# Patient Record
Sex: MALE | Race: WHITE
[De-identification: names, ages, dates, MRNs, and addresses within clinical notes are randomized per-mention and may not be internally consistent; named-entity substitution may affect disease eponyms.]

---

## 2022-05-18 ENCOUNTER — HOSPITAL ENCOUNTER (EMERGENCY)
Dept: HOSPITAL 97 - ER | Age: 57
Discharge: HOME | End: 2022-05-18
Payer: SELF-PAY

## 2022-05-18 VITALS — SYSTOLIC BLOOD PRESSURE: 123 MMHG | DIASTOLIC BLOOD PRESSURE: 74 MMHG

## 2022-05-18 VITALS — OXYGEN SATURATION: 95 %

## 2022-05-18 DIAGNOSIS — R11.0: ICD-10-CM

## 2022-05-18 DIAGNOSIS — I10: ICD-10-CM

## 2022-05-18 DIAGNOSIS — R07.9: ICD-10-CM

## 2022-05-18 DIAGNOSIS — R51.9: Primary | ICD-10-CM

## 2022-05-18 LAB
BUN BLD-MCNC: 12 MG/DL (ref 7–18)
GLUCOSE SERPLBLD-MCNC: 98 MG/DL (ref 74–106)
HCT VFR BLD CALC: 48.8 % (ref 39.6–49)
LYMPHOCYTES # SPEC AUTO: 2 K/UL (ref 0.7–4.9)
PMV BLD: 9.9 FL (ref 7.6–11.3)
POTASSIUM SERPL-SCNC: 3.5 MMOL/L (ref 3.5–5.1)
RBC # BLD: 5.12 M/UL (ref 4.33–5.43)
TROPONIN I SERPL HS-MCNC: 4.5 PG/ML (ref ?–58.9)

## 2022-05-18 PROCEDURE — 80048 BASIC METABOLIC PNL TOTAL CA: CPT

## 2022-05-18 PROCEDURE — 36415 COLL VENOUS BLD VENIPUNCTURE: CPT

## 2022-05-18 PROCEDURE — 96374 THER/PROPH/DIAG INJ IV PUSH: CPT

## 2022-05-18 PROCEDURE — 85025 COMPLETE CBC W/AUTO DIFF WBC: CPT

## 2022-05-18 PROCEDURE — 70450 CT HEAD/BRAIN W/O DYE: CPT

## 2022-05-18 PROCEDURE — 96375 TX/PRO/DX INJ NEW DRUG ADDON: CPT

## 2022-05-18 PROCEDURE — 93005 ELECTROCARDIOGRAM TRACING: CPT

## 2022-05-18 PROCEDURE — 71045 X-RAY EXAM CHEST 1 VIEW: CPT

## 2022-05-18 PROCEDURE — 99284 EMERGENCY DEPT VISIT MOD MDM: CPT

## 2022-05-18 PROCEDURE — 84484 ASSAY OF TROPONIN QUANT: CPT

## 2022-05-18 NOTE — RAD REPORT
EXAM DESCRIPTION:  [facility]ra Single View5/18/2022 8:02 am

 

CLINICAL HISTORY:  Chest pain

 

COMPARISON:  none

 

FINDINGS:   The lungs appear clear of acute infiltrate. The heart is mildly enlarged. Mild prominence
 of mediastinum.

 

PA and lateral chest series recommended

## 2022-05-18 NOTE — ER
Nurse's Notes                                                                                     

 [facility]                                                                 

Name: Andrew Guerrero                                                                              

Age: 56 yrs                                                                                       

Sex: Male                                                                                         

: 1965                                                                                   

MRN: [id number]                                                                                   

Arrival Date: 2022                                                                          

Time: 07:36                                                                                       

Account#: [id number]                                                                            

Bed 18                                                                                            

Private MD:                                                                                       

Diagnosis: Headache;Essential (primary) hypertension                                              

                                                                                                  

Presentation:                                                                                     

                                                                                             

07:50 Chief complaint: Patient states: he woke up at approx 0530 with a "terrible" headache   ap3 

      and chest pain. Patient reports he is staying with his parents after the recent death       

      of his mother. He states they are heavy smokers, and isn't sure if his symptoms are         

      caused by being around the heavy smoke or his elevated blood pressure. Patient states       

      the headache is a 8/10 on a scale of 0-10. He his holding pressure on his head with his     

      hand in attempt to alleviate pain. Coronavirus screen: At this time, the client does        

      not indicate any symptoms associated with coronavirus-19. Ebola Screen: No symptoms or      

      risks identified at this time. Initial Sepsis Screen: Does the patient meet any 2           

      criteria? No. Patient's initial sepsis screen is negative. Does the patient have a          

      suspected source of infection? No. Patient's initial sepsis screen is negative. Risk        

      Assessment: Do you want to hurt yourself or someone else? Patient reports no desire to      

      harm self or others. Onset of symptoms was May 18, 2022 at 05:30.                           

07:50 Method Of Arrival: Ambulatory                                                           ap3 

07:50 Acuity: DANIELA 3                                                                           ap3 

                                                                                                  

Triage Assessment:                                                                                

07:55 Headache History: The patient has had previous headaches and this one is different than ap3 

      previous episodes. General: Appears uncomfortable, Behavior is cooperative. Pain:           

      Complains of pain in head and chest Pain currently is 8 out of 10 on a pain scale. Pain     

      began 2 hours ago. Also complains of chest pain. Neuro: Level of Consciousness is           

      awake, alert, obeys commands, Oriented to person, place, time, situation, Speech is         

      normal. Cardiovascular: Patient's skin is warm and dry. Respiratory: Airway is patent.      

                                                                                                  

- Immunization history:: Adult Immunizations unknown.                                             

- Social history:: Smoking status: Patient denies any tobacco usage or history of.                

  Patient uses alcohol, on a daily basis.                                                         

                                                                                                  

                                                                                                  

Screenin:26 Abuse screen: Denies threats or abuse. Denies injuries from another. Nutritional        bp  

      screening: No deficits noted. Tuberculosis screening: No symptoms or risk factors           

      identified. Fall Risk None identified.                                                      

                                                                                                  

Assessment:                                                                                       

08:25 General: SEE TRIAGE NOTE.                                                               bp  

09:52 Reassessment: PT D/C HOME AMBULATORY WITH FAMILY, DX WITH HEADACHE AND HTN.             bp  

                                                                                                  

Vital Signs:                                                                                      

07:50  / 110; Pulse 76; Resp 17; Pulse Ox 98% ; Weight 95.25 kg; Height 5 ft. 9 in.     ap3 

      (175.26 cm); Pain 8/10;                                                                     

09:01  / 82; Pulse 63; Resp 17; Pulse Ox 95% ;                                          bp  

09:51  / 74; Pulse 64; Resp 11; Pulse Ox 95% ;                                          bp  

07:50 Body Mass Index 31.01 (95.25 kg, 175.26 cm)                                             ap3 

                                                                                                  

ED Course:                                                                                        

07:36 Patient arrived in ED.                                                                  as  

07:42 Homar Phan DO is Attending Physician.                                                ms3 

07:53 Triage completed.                                                                       ap3 

07:56 Arm band placed on right wrist.                                                         ap3 

08:04 XRAY Chest (1 view) In Process Unspecified.                                             EDMS

08:09 CT Head Brain wo Cont In Process Unspecified.                                           EDMS

08:24 Horacio Foster, RN is Primary Nurse.                                                    bp  

08:26 Patient has correct armband on for positive identification. Bed in low position. Call   bp  

      light in reach. Side rails up X2.                                                           

08:39 Troponin HS Sent.                                                                       mh5 

08:39 Basic Metabolic Panel Sent.                                                             mh5 

08:40 Initial lab(s) drawn, by me, sent to lab. EKG done, by ED staff, reviewed by Homar Phan DO. Inserted saline lock: 20 gauge in right antecubital area, using aseptic            

      technique. Blood collected.                                                                 

08:41 Placed in gown. Warm blanket given. Pillow given. Cardiac monitor on. Pulse ox on. NIBP mh5 

      on.                                                                                         

09:39 Saúl Fitzgerald MD is Referral Physician.                                               ms3 

09:52 No provider procedures requiring assistance completed. IV discontinued.                 bp  

                                                                                                  

Administered Medications:                                                                         

07:44 CANCELLED (Physician Discretion): Aspirin Chewable Tablet 324 mg PO once; 81 mg tablets ms3 

      x 4                                                                                         

08:30 Drug: Ibuprofen 600 mg Route: PO;                                                       bp  

09:10 Drug: Reglan (metoCLOPramide) 10 mg Route: IVP; Site: right antecubital;                bp  

09:10 Drug: Benadryl (diphenhydrAMINE) 25 mg Route: IVP; Site: right antecubital;             bp  

09:10 Drug: Decadron - Dexamethasone 10 mg Route: IVP; Site: right antecubital;               bp  

                                                                                                  

                                                                                                  

Medication:                                                                                       

07:57 VIS not applicable for this client.                                                     ap3 

                                                                                                  

Outcome:                                                                                          

09:40 Discharge ordered by MD.                                                                ms3 

09:52 Discharged to home ambulatory, with family.                                             bp  

09:52 Condition: stable                                                                           

09:52 Discharge instructions given to patient, Instructed on discharge instructions, follow       

      up and referral plans. Demonstrated understanding of instructions, follow-up care.          

09:53 Patient left the ED.                                                                    bp  

                                                                                                  

Signatures:                                                                                       

Dispatcher [employer]                           EDMS                                                 

Pati Hagan Maria                              5                                                  

Horacio Foster RN                      RN   bp                                                   

Amisha Rasmussen RN                    RN   ap3                                                  

Homar Phan DO                        DO   ms3                                                  

                                                                                                  

**************************************************************************************************

## 2022-05-18 NOTE — RAD REPORT
EXAM DESCRIPTION:  CT - Head Brain Wo Cont - 5/18/2022 8:08 am

 

CLINICAL HISTORY:  Headache

 

COMPARISON:  None.

 

TECHNIQUE:  Computed axial tomography of the head was obtained. IV contrast was not requested.

 

All CT scans are performed using dose optimization technique as appropriate and may include automated
 exposure control or mA/KV adjustment according to patient size.

 

FINDINGS:  An intracranial  bleed is not seen .

 

The ventricles are normal in caliber.

 

No significant hypodense areas within the brain visualized

 

No extra-axial fluid collection is noted.

 

Fluid within the sinuses/ mastoids is not seen. Mucus retention cyst right maxillary sinus

 

IMPRESSION:  No acute intracranial abnormality is seen. If patient's symptoms persist  MRI of the bra
in would be recommended.

## 2022-05-18 NOTE — EDPHYS
Physician Documentation                                                                           

 [facility]                                                                 

Name: Andrew Guerrero                                                                              

Age: 56 yrs                                                                                       

Sex: Male                                                                                         

: 1965                                                                                   

MRN: [id number]                                                                                   

Arrival Date: 2022                                                                          

Time: 07:36                                                                                       

Account#: [id number]                                                                            

Bed 18                                                                                            

Private MD:                                                                                       

ED Physician Homar Phan                                                                         

HPI:                                                                                              

                                                                                             

07:44 This 56 yrs old Male presents to ER via Unassigned with complaints of High Blood        ms3 

      Pressure, Headache, Chest Pressure.                                                         

07:44 The patient has elevated blood pressure and discovered this at a relative's home.       ms3 

      Onset: The symptoms/episode began/occurred acutely, 2 hour(s) ago. Modifying factors:       

      The symptoms are aggravated by Nothing, The symptoms are alleviated by Nothing.             

      Associated signs and symptoms: Pertinent positives: chest pain, headache, nausea,           

      vomiting. Severity of symptoms: At its worst the blood pressure was severe, this            

      morning. The patient has experienced similar episodes in the past.                          

                                                                                                  

- Immunization history:: Adult Immunizations unknown.                                             

- Social history:: Smoking status: Patient denies any tobacco usage or history of.                

  Patient uses alcohol, on a daily basis.                                                         

                                                                                                  

                                                                                                  

ROS:                                                                                              

07:44 Constitutional: Negative for fever, and chills. Eyes: Negative for injury, pain,        ms3 

      redness, and discharge, Neck: Negative for injury, pain, and swelling, Respiratory:         

      Negative for shortness of breath, cough, wheezing, and pleuritic chest pain,                

      MS/Extremity: Negative for injury and deformity, Skin: Negative for injury, rash, and       

      discoloration, Psych: Negative for depression, anxiety, suicide ideation, homicidal         

      ideation, and hallucinations.                                                               

07:44 Cardiovascular: Positive for chest pain.                                                    

07:44 Abdomen/GI: Positive for nausea and vomiting.                                               

07:44 Neuro: Positive for headache.                                                               

                                                                                                  

Exam:                                                                                             

07:44 Constitutional:  This is a well developed, well nourished patient who is awake, alert,  ms3 

      and in no acute distress. Head/Face:  Normocephalic, atraumatic. Eyes:  Pupils equal        

      round and reactive to light, extra-ocular motions intact.  Lids and lashes normal.          

      Conjunctiva and sclera are non-icteric and not injected.  Periorbital areas with no         

      swelling, redness, or edema. Neck:  Trachea midline, no cervical lymphadenopathy.           

      Supple, full range of motion without nuchal rigidity, or vertebral point tenderness.        

      No Meningismus. Chest/axilla:  Normal chest wall appearance and motion.  Nontender with     

      no deformity.   Cardiovascular:  Regular rate and rhythm with a normal S1 and S2.  No       

      gallops, murmurs, or rubs.  Normal PMI, no JVD.  No pulse deficits. Respiratory:  Lungs     

      have equal breath sounds bilaterally, clear to auscultation and percussion.  No rales,      

      rhonchi or wheezes noted.  No increased work of breathing, no retractions or nasal          

      flaring. Abdomen/GI:  Soft, non-tender, with normal bowel sounds.  No distension or         

      tympany.  No guarding or rebound.  No evidence of tenderness throughout. Skin:  Warm,       

      dry with normal turgor.  Normal color with no rashes, no lesions, and no evidence of        

      cellulitis. Neuro:  Awake and alert, GCS 15, oriented to person, place, time, and           

      situation.  Cranial nerves II-XII grossly intact.  Motor strength 5/5 in all                

      extremities.  Sensory grossly intact.  Cerebellar exam normal.  Normal gait. Psych:         

      Awake, alert, with orientation to person, place and time.  Behavior, mood, and affect       

      are within normal limits.                                                                   

07:44 ECG was reviewed by the Attending Physician.                                                

                                                                                                  

Vital Signs:                                                                                      

07:50  / 110; Pulse 76; Resp 17; Pulse Ox 98% ; Weight 95.25 kg; Height 5 ft. 9 in.     ap3 

      (175.26 cm); Pain 8/10;                                                                     

09:01  / 82; Pulse 63; Resp 17; Pulse Ox 95% ;                                          bp  

09:51  / 74; Pulse 64; Resp 11; Pulse Ox 95% ;                                          bp  

07:50 Body Mass Index 31.01 (95.25 kg, 175.26 cm)                                             ap3 

                                                                                                  

MDM:                                                                                              

07:42 Patient medically screened.                                                             ms3 

14:11 Differential diagnosis: intracerebral hemorrhage, ACS vs HTN. Data reviewed: vital      ms3 

      signs, nurses notes, lab test result(s), EKG, radiologic studies, plain films. Data         

      interpreted: Pulse oximetry: on room air is 95 %. Interpretation: normal. Counseling: I     

      had a detailed discussion with the patient and/or guardian regarding: the historical        

      points, exam findings, and any diagnostic results supporting the discharge/admit            

      diagnosis, lab results, radiology results, the need for outpatient follow up. ED            

      course: Discussed labs, CXR, EKG, physical exam findings with patient. Patient to           

      follow-up with primary care physician in 2-3 days. Patient understands and agrees with      

      plan. All questions were answered. Return precautions discussed include worsening           

      symptoms, or any other concerns. On reevaluation patient is alert and oriented x4, in       

      no apparent distress, nontoxic-appearing, speaking full sentences, ambulatory in            

      emergency department. .                                                                     

                                                                                                  

                                                                                             

07:42 Order name: Basic Metabolic Panel; Complete Time: 08:57                                 ms3 

                                                                                             

07:42 Order name: CBC with Diff; Complete Time: 08:49                                         ms3 

                                                                                             

07:42 Order name: Troponin HS; Complete Time: 08:57                                           ms3 

                                                                                             

07:42 Order name: XRAY Chest (1 view); Complete Time: 08:49                                   ms3 

                                                                                             

07:44 Order name: CT Head Brain wo Cont; Complete Time: 08:49                                 ms3 

                                                                                             

07:42 Order name: EKG; Complete Time: 07:43                                                   ms3 

                                                                                             

07:42 Order name: Cardiac monitoring; Complete Time: 08:39                                    ms3 

                                                                                             

07:42 Order name: EKG - Nurse/Tech; Complete Time: 08:39                                      ms3 

                                                                                             

07:42 Order name: IV Saline Lock; Complete Time: 08:39                                        ms3 

                                                                                             

07:42 Order name: Labs collected and sent; Complete Time: 08:39                               ms3 

                                                                                             

07:42 Order name: O2 Per Protocol; Complete Time: 08:39                                       ms3 

                                                                                             

07:42 Order name: O2 Sat Monitoring; Complete Time: 08:39                                     ms3 

                                                                                                  

EC:44 Rate is 71 beats/min. Rhythm is regular. Left axis deviation noted. QRS interval is     ms3 

      normal. Clinical impression: NSR w/ Non-specific ST/T Changes. Interpreted by me.           

                                                                                                  

Administered Medications:                                                                         

07:44 CANCELLED (Physician Discretion): Aspirin Chewable Tablet 324 mg PO once; 81 mg tablets ms3 

      x 4                                                                                         

08:30 Drug: Ibuprofen 600 mg Route: PO;                                                       bp  

09:10 Drug: Reglan (metoCLOPramide) 10 mg Route: IVP; Site: right antecubital;                bp  

09:10 Drug: Benadryl (diphenhydrAMINE) 25 mg Route: IVP; Site: right antecubital;             bp  

09:10 Drug: Decadron - Dexamethasone 10 mg Route: IVP; Site: right antecubital;               bp  

                                                                                                  

                                                                                                  

Disposition Summary:                                                                              

22 09:40                                                                                    

Discharge Ordered                                                                                 

      Location: Home                                                                          ms3 

      Condition: Stable                                                                       ms3 

      Diagnosis                                                                                   

        - Headache                                                                            ms3 

        - Essential (primary) hypertension                                                    ms3 

      Followup:                                                                               ms3 

        - With: Saúl Fitzgerald MD                                                                 

        - When: 2 - 3 days                                                                         

        - Reason: Recheck today's complaints                                                       

      Discharge Instructions:                                                                     

        - Discharge Summary Sheet                                                             ms3 

        - General Headache Without Cause                                                      ms3 

        - Hypertension, Adult                                                                 ms3 

      Forms:                                                                                      

        - Medication Reconciliation Form                                                      ms3 

        - Thank You Letter                                                                    ms3 

        - Antibiotic Education                                                                ms3 

        - Prescription Opioid Use                                                             ms3 

Signatures:                                                                                       

Dispatcher [employer]                           Horacio Peres RN                      RN   bp                                                   

Amisha Rasmussen RN                    RN   ap3                                                  

Homar Phan,                         DO   ms3                                                  

                                                                                                  

Corrections: (The following items were deleted from the chart)                                    

07:44 07:42 Aspirin Chewable Tablet 324 mg PO once; 81 mg tablets x 4 ordered. ms3            ms3 

                                                                                                  

**************************************************************************************************

## 2022-05-19 NOTE — EKG
Test Date:    2022-05-18               Test Time:    07:44:09

Technician:   ALP                                    

                                                     

MEASUREMENT RESULTS:                                       

Intervals:                                           

Rate:         71                                     

ID:           [id number]                                    

QRSD:         96                                     

QT:           406                                    

QTc:          441                                    

Axis:                                                

P:            6                                      

ID:           124                                    

QRS:          -6                                     

T:            40                                     

                                                     

INTERPRETIVE STATEMENTS:                                       

                                                     

Normal sinus rhythm

ST & T wave abnormality, consider anterior ischemia

Abnormal ECG

No previous ECG available for comparison



Electronically Signed On 05-19-22 07:37:22 CDT by Slim Desai